# Patient Record
Sex: FEMALE | Race: WHITE | URBAN - METROPOLITAN AREA
[De-identification: names, ages, dates, MRNs, and addresses within clinical notes are randomized per-mention and may not be internally consistent; named-entity substitution may affect disease eponyms.]

---

## 2019-02-12 ENCOUNTER — APPOINTMENT (OUTPATIENT)
Dept: GENERAL RADIOLOGY | Age: 24
End: 2019-02-12

## 2019-02-12 ENCOUNTER — WALK IN (OUTPATIENT)
Dept: URGENT CARE | Age: 24
End: 2019-02-12

## 2019-02-12 DIAGNOSIS — M79.641 RIGHT HAND PAIN: Primary | ICD-10-CM

## 2019-02-12 PROCEDURE — 73130 X-RAY EXAM OF HAND: CPT | Performed by: PHYSICIAN ASSISTANT

## 2019-02-12 PROCEDURE — 99203 OFFICE O/P NEW LOW 30 MIN: CPT | Performed by: PHYSICIAN ASSISTANT

## 2019-02-12 ASSESSMENT — ENCOUNTER SYMPTOMS: CONSTITUTIONAL NEGATIVE: 1

## 2019-02-12 ASSESSMENT — PAIN SCALES - GENERAL: PAINLEVEL: 3-4

## 2019-02-13 ENCOUNTER — TELEPHONE (OUTPATIENT)
Dept: URGENT CARE | Age: 24
End: 2019-02-13

## 2020-11-01 ENCOUNTER — RX ONLY (RX ONLY)
Age: 25
End: 2020-11-01

## 2021-01-04 ENCOUNTER — HEALTH MAINTENANCE LETTER (OUTPATIENT)
Age: 26
End: 2021-01-04

## 2021-05-18 ENCOUNTER — APPOINTMENT (OUTPATIENT)
Dept: URBAN - METROPOLITAN AREA CLINIC 253 | Age: 26
Setting detail: DERMATOLOGY
End: 2021-05-18

## 2021-05-18 VITALS — HEIGHT: 63 IN | WEIGHT: 125 LBS | RESPIRATION RATE: 14 BRPM

## 2021-05-18 DIAGNOSIS — L70.0 ACNE VULGARIS: ICD-10-CM

## 2021-05-18 PROCEDURE — OTHER COUNSELING: OTHER

## 2021-05-18 PROCEDURE — OTHER ADDITIONAL NOTES: OTHER

## 2021-05-18 PROCEDURE — 99204 OFFICE O/P NEW MOD 45 MIN: CPT

## 2021-05-18 PROCEDURE — 81025 URINE PREGNANCY TEST: CPT

## 2021-05-18 PROCEDURE — OTHER URINE PREGNANCY TEST: OTHER

## 2021-05-18 PROCEDURE — OTHER ISOTRETINOIN INITIATION: OTHER

## 2021-05-18 ASSESSMENT — LOCATION DETAILED DESCRIPTION DERM: LOCATION DETAILED: LEFT MEDIAL FOREHEAD

## 2021-05-18 ASSESSMENT — LOCATION SIMPLE DESCRIPTION DERM: LOCATION SIMPLE: LEFT FOREHEAD

## 2021-05-18 ASSESSMENT — LOCATION ZONE DERM: LOCATION ZONE: FACE

## 2021-05-18 NOTE — PROCEDURE: COUNSELING
Minocycline Counseling: Patient advised regarding possible photosensitivity and discoloration of the teeth, skin, lips, tongue and gums.  Patient instructed to avoid sunlight, if possible.  When exposed to sunlight, patients should wear protective clothing, sunglasses, and sunscreen.  The patient was instructed to call the office immediately if the following severe adverse effects occur:  hearing changes, easy bruising/bleeding, severe headache, or vision changes.  The patient verbalized understanding of the proper use and possible adverse effects of minocycline.  All of the patient's questions and concerns were addressed.
Sarecycline Pregnancy And Lactation Text: This medication is Pregnancy Category D and not consider safe during pregnancy. It is also excreted in breast milk.
Spironolactone Pregnancy And Lactation Text: This medication can cause feminization of the male fetus and should be avoided during pregnancy. The active metabolite is also found in breast milk.
Isotretinoin Pregnancy And Lactation Text: This medication is Pregnancy Category X and is considered extremely dangerous during pregnancy. It is unknown if it is excreted in breast milk.
Topical Retinoid Pregnancy And Lactation Text: This medication is Pregnancy Category C. It is unknown if this medication is excreted in breast milk.
Sarecycline Counseling: Patient advised regarding possible photosensitivity and discoloration of the teeth, skin, lips, tongue and gums.  Patient instructed to avoid sunlight, if possible.  When exposed to sunlight, patients should wear protective clothing, sunglasses, and sunscreen.  The patient was instructed to call the office immediately if the following severe adverse effects occur:  hearing changes, easy bruising/bleeding, severe headache, or vision changes.  The patient verbalized understanding of the proper use and possible adverse effects of sarecycline.  All of the patient's questions and concerns were addressed.
Bactrim Pregnancy And Lactation Text: This medication is Pregnancy Category D and is known to cause fetal risk.  It is also excreted in breast milk.
Bactrim Counseling:  I discussed with the patient the risks of sulfa antibiotics including but not limited to GI upset, allergic reaction, drug rash, diarrhea, dizziness, photosensitivity, and yeast infections.  Rarely, more serious reactions can occur including but not limited to aplastic anemia, agranulocytosis, methemoglobinemia, blood dyscrasias, liver or kidney failure, lung infiltrates or desquamative/blistering drug rashes.
High Dose Vitamin A Counseling: Side effects reviewed, pt to contact office should one occur.
Topical Retinoid counseling:  Patient advised to apply a pea-sized amount only at bedtime and wait 30 minutes after washing their face before applying.  If too drying, patient may add a non-comedogenic moisturizer. The patient verbalized understanding of the proper use and possible adverse effects of retinoids.  All of the patient's questions and concerns were addressed.
Include Pregnancy/Lactation Warning?: No
Spironolactone Counseling: Patient advised regarding risks of diarrhea, abdominal pain, hyperkalemia, birth defects (for female patients), liver toxicity and renal toxicity. The patient may need blood work to monitor liver and kidney function and potassium levels while on therapy. The patient verbalized understanding of the proper use and possible adverse effects of spironolactone.  All of the patient's questions and concerns were addressed.
Topical Sulfur Applications Pregnancy And Lactation Text: This medication is Pregnancy Category C and has an unknown safety profile during pregnancy. It is unknown if this topical medication is excreted in breast milk.
Birth Control Pills Counseling: Birth Control Pill Counseling: I discussed with the patient the potential side effects of OCPs including but not limited to increased risk of stroke, heart attack, thrombophlebitis, deep venous thrombosis, hepatic adenomas, breast changes, GI upset, headaches, and depression.  The patient verbalized understanding of the proper use and possible adverse effects of OCPs. All of the patient's questions and concerns were addressed.
Dapsone Pregnancy And Lactation Text: This medication is Pregnancy Category C and is not considered safe during pregnancy or breast feeding.
Azithromycin Counseling:  I discussed with the patient the risks of azithromycin including but not limited to GI upset, allergic reaction, drug rash, diarrhea, and yeast infections.
Erythromycin Counseling:  I discussed with the patient the risks of erythromycin including but not limited to GI upset, allergic reaction, drug rash, diarrhea, increase in liver enzymes, and yeast infections.
Topical Clindamycin Counseling: Patient counseled that this medication may cause skin irritation or allergic reactions.  In the event of skin irritation, the patient was advised to reduce the amount of the drug applied or use it less frequently.   The patient verbalized understanding of the proper use and possible adverse effects of clindamycin.  All of the patient's questions and concerns were addressed.
Birth Control Pills Pregnancy And Lactation Text: This medication should be avoided if pregnant and for the first 30 days post-partum.
Detail Level: Zone
High Dose Vitamin A Pregnancy And Lactation Text: High dose vitamin A therapy is contraindicated during pregnancy and breast feeding.
Dapsone Counseling: I discussed with the patient the risks of dapsone including but not limited to hemolytic anemia, agranulocytosis, rashes, methemoglobinemia, kidney failure, peripheral neuropathy, headaches, GI upset, and liver toxicity.  Patients who start dapsone require monitoring including baseline LFTs and weekly CBCs for the first month, then every month thereafter.  The patient verbalized understanding of the proper use and possible adverse effects of dapsone.  All of the patient's questions and concerns were addressed.
Tazorac Pregnancy And Lactation Text: This medication is not safe during pregnancy. It is unknown if this medication is excreted in breast milk.
Tetracycline Counseling: Patient counseled regarding possible photosensitivity and increased risk for sunburn.  Patient instructed to avoid sunlight, if possible.  When exposed to sunlight, patients should wear protective clothing, sunglasses, and sunscreen.  The patient was instructed to call the office immediately if the following severe adverse effects occur:  hearing changes, easy bruising/bleeding, severe headache, or vision changes.  The patient verbalized understanding of the proper use and possible adverse effects of tetracycline.  All of the patient's questions and concerns were addressed. Patient understands to avoid pregnancy while on therapy due to potential birth defects.
Topical Sulfur Applications Counseling: Topical Sulfur Counseling: Patient counseled that this medication may cause skin irritation or allergic reactions.  In the event of skin irritation, the patient was advised to reduce the amount of the drug applied or use it less frequently.   The patient verbalized understanding of the proper use and possible adverse effects of topical sulfur application.  All of the patient's questions and concerns were addressed.
Isotretinoin Counseling: Patient should get monthly blood tests, not donate blood, not drive at night if vision affected, not share medication, and not undergo elective surgery for 6 months after tx completed. Side effects reviewed, pt to contact office should one occur.
Azithromycin Pregnancy And Lactation Text: This medication is considered safe during pregnancy and is also secreted in breast milk.
Doxycycline Counseling:  Patient counseled regarding possible photosensitivity and increased risk for sunburn.  Patient instructed to avoid sunlight, if possible.  When exposed to sunlight, patients should wear protective clothing, sunglasses, and sunscreen.  The patient was instructed to call the office immediately if the following severe adverse effects occur:  hearing changes, easy bruising/bleeding, severe headache, or vision changes.  The patient verbalized understanding of the proper use and possible adverse effects of doxycycline.  All of the patient's questions and concerns were addressed.
Topical Clindamycin Pregnancy And Lactation Text: This medication is Pregnancy Category B and is considered safe during pregnancy. It is unknown if it is excreted in breast milk.
Benzoyl Peroxide Pregnancy And Lactation Text: This medication is Pregnancy Category C. It is unknown if benzoyl peroxide is excreted in breast milk.
Doxycycline Pregnancy And Lactation Text: This medication is Pregnancy Category D and not consider safe during pregnancy. It is also excreted in breast milk but is considered safe for shorter treatment courses.
Benzoyl Peroxide Counseling: Patient counseled that medicine may cause skin irritation and bleach clothing.  In the event of skin irritation, the patient was advised to reduce the amount of the drug applied or use it less frequently.   The patient verbalized understanding of the proper use and possible adverse effects of benzoyl peroxide.  All of the patient's questions and concerns were addressed.
Tazorac Counseling:  Patient advised that medication is irritating and drying.  Patient may need to apply sparingly and wash off after an hour before eventually leaving it on overnight.  The patient verbalized understanding of the proper use and possible adverse effects of tazorac.  All of the patient's questions and concerns were addressed.
Erythromycin Pregnancy And Lactation Text: This medication is Pregnancy Category B and is considered safe during pregnancy. It is also excreted in breast milk.

## 2021-05-18 NOTE — PROCEDURE: ADDITIONAL NOTES
Render Risk Assessment In Note?: no
Detail Level: Simple
Additional Notes: Fasting labs at next OV\\n\\nIsotretinoin Counseling: Reviewed with patient that treatment includes monthly visits with pregnancy tests and frequent blood draws. Explained that the medication is metabolized through the liver so we monitor liver function and cholesterol levels. Stated that typical treatment time is 5-9 months. Patient should be on two forms of birth control or pledge abstinence, not donate blood or plasma, not share medication, and not undergo elective surgery or procedures including waxing, tattoos, or piercings while on treatment or for 6 months after treatment is completed. Side effects were reviewed with the patient in detail including but not limited to dry skin, lips, eyes and nose, nosebleeds, joint or muscle aches, flushing, and headaches. Rare but important side effects to notify the clinic about include mood changes, depression, or bowel changes. Recommended that the patient review the iPledge booklet in detail. Counseled that in many patients, acne may return at an unpredictable time for unknown reasons. All questions were answered regarding this medication. \\n\\nA clinical assistant was present for the exam. Care instructions were explained to the patient in detail. Told patient to call with any concerns or questions. The patient verbalized understanding and agreement of the education provided and the treatment plan. Encouraged patient to schedule a follow up appointment right after visit. At the end of the visit, all questions had been answered and the patient was satisfied with the visit.

## 2021-05-18 NOTE — HPI: PIMPLES (ACNE)
What Type Of Note Output Would You Prefer (Optional)?: Standard Output
How Severe Is Your Acne?: moderate
Is This A New Presentation, Or A Follow-Up?: Acne
Additional Comments (Use Complete Sentences): On the pill, also had tried tazorac in the past which helped.

## 2021-05-26 VITALS
HEART RATE: 76 BPM | TEMPERATURE: 98 F | OXYGEN SATURATION: 100 % | HEIGHT: 63 IN | RESPIRATION RATE: 16 BRPM | WEIGHT: 125 LBS | BODY MASS INDEX: 22.15 KG/M2

## 2021-06-22 ENCOUNTER — APPOINTMENT (OUTPATIENT)
Dept: URBAN - METROPOLITAN AREA CLINIC 253 | Age: 26
Setting detail: DERMATOLOGY
End: 2021-06-22

## 2021-06-22 VITALS — RESPIRATION RATE: 15 BRPM | HEIGHT: 63 IN | WEIGHT: 125 LBS

## 2021-06-22 DIAGNOSIS — L70.0 ACNE VULGARIS: ICD-10-CM

## 2021-06-22 PROCEDURE — OTHER ADDITIONAL NOTES: OTHER

## 2021-06-22 PROCEDURE — OTHER ISOTRETINOIN INITIATION: OTHER

## 2021-06-22 PROCEDURE — OTHER PRESCRIPTION: OTHER

## 2021-06-22 PROCEDURE — 81025 URINE PREGNANCY TEST: CPT

## 2021-06-22 PROCEDURE — 99214 OFFICE O/P EST MOD 30 MIN: CPT

## 2021-06-22 PROCEDURE — OTHER ORDER TESTS: OTHER

## 2021-06-22 PROCEDURE — OTHER COUNSELING: OTHER

## 2021-06-22 PROCEDURE — OTHER URINE PREGNANCY TEST: OTHER

## 2021-06-22 RX ORDER — ISOTRETINOIN 20 MG/1
CAPSULE, LIQUID FILLED ORAL QD
Qty: 30 | Refills: 0 | Status: ERX | COMMUNITY
Start: 2021-06-22

## 2021-06-22 ASSESSMENT — LOCATION SIMPLE DESCRIPTION DERM: LOCATION SIMPLE: LEFT FOREHEAD

## 2021-06-22 ASSESSMENT — LOCATION DETAILED DESCRIPTION DERM: LOCATION DETAILED: LEFT MEDIAL FOREHEAD

## 2021-06-22 ASSESSMENT — LOCATION ZONE DERM: LOCATION ZONE: FACE

## 2021-06-22 NOTE — HPI: PIMPLES (ACNE)
What Type Of Note Output Would You Prefer (Optional)?: Standard Output
How Severe Is Your Acne?: severe
Is This A New Presentation, Or A Follow-Up?: Follow Up Acne
Additional Comments (Use Complete Sentences): Here ready to start accutane.

## 2021-06-22 NOTE — PROCEDURE: COUNSELING
Minocycline Counseling: Patient advised regarding possible photosensitivity and discoloration of the teeth, skin, lips, tongue and gums.  Patient instructed to avoid sunlight, if possible.  When exposed to sunlight, patients should wear protective clothing, sunglasses, and sunscreen.  The patient was instructed to call the office immediately if the following severe adverse effects occur:  hearing changes, easy bruising/bleeding, severe headache, or vision changes.  The patient verbalized understanding of the proper use and possible adverse effects of minocycline.  All of the patient's questions and concerns were addressed.
Sarecycline Pregnancy And Lactation Text: This medication is Pregnancy Category D and not consider safe during pregnancy. It is also excreted in breast milk.
Erythromycin Pregnancy And Lactation Text: This medication is Pregnancy Category B and is considered safe during pregnancy. It is also excreted in breast milk.
Azithromycin Counseling:  I discussed with the patient the risks of azithromycin including but not limited to GI upset, allergic reaction, drug rash, diarrhea, and yeast infections.
Isotretinoin Pregnancy And Lactation Text: This medication is Pregnancy Category X and is considered extremely dangerous during pregnancy. It is unknown if it is excreted in breast milk.
Birth Control Pills Pregnancy And Lactation Text: This medication should be avoided if pregnant and for the first 30 days post-partum.
Bactrim Pregnancy And Lactation Text: This medication is Pregnancy Category D and is known to cause fetal risk.  It is also excreted in breast milk.
Spironolactone Pregnancy And Lactation Text: This medication can cause feminization of the male fetus and should be avoided during pregnancy. The active metabolite is also found in breast milk.
Topical Sulfur Applications Counseling: Topical Sulfur Counseling: Patient counseled that this medication may cause skin irritation or allergic reactions.  In the event of skin irritation, the patient was advised to reduce the amount of the drug applied or use it less frequently.   The patient verbalized understanding of the proper use and possible adverse effects of topical sulfur application.  All of the patient's questions and concerns were addressed.
Bactrim Counseling:  I discussed with the patient the risks of sulfa antibiotics including but not limited to GI upset, allergic reaction, drug rash, diarrhea, dizziness, photosensitivity, and yeast infections.  Rarely, more serious reactions can occur including but not limited to aplastic anemia, agranulocytosis, methemoglobinemia, blood dyscrasias, liver or kidney failure, lung infiltrates or desquamative/blistering drug rashes.
Include Pregnancy/Lactation Warning?: No
Birth Control Pills Counseling: Birth Control Pill Counseling: I discussed with the patient the potential side effects of OCPs including but not limited to increased risk of stroke, heart attack, thrombophlebitis, deep venous thrombosis, hepatic adenomas, breast changes, GI upset, headaches, and depression.  The patient verbalized understanding of the proper use and possible adverse effects of OCPs. All of the patient's questions and concerns were addressed.
Spironolactone Counseling: Patient advised regarding risks of diarrhea, abdominal pain, hyperkalemia, birth defects (for female patients), liver toxicity and renal toxicity. The patient may need blood work to monitor liver and kidney function and potassium levels while on therapy. The patient verbalized understanding of the proper use and possible adverse effects of spironolactone.  All of the patient's questions and concerns were addressed.
Dapsone Pregnancy And Lactation Text: This medication is Pregnancy Category C and is not considered safe during pregnancy or breast feeding.
Doxycycline Pregnancy And Lactation Text: This medication is Pregnancy Category D and not consider safe during pregnancy. It is also excreted in breast milk but is considered safe for shorter treatment courses.
Erythromycin Counseling:  I discussed with the patient the risks of erythromycin including but not limited to GI upset, allergic reaction, drug rash, diarrhea, increase in liver enzymes, and yeast infections.
Topical Sulfur Applications Pregnancy And Lactation Text: This medication is Pregnancy Category C and has an unknown safety profile during pregnancy. It is unknown if this topical medication is excreted in breast milk.
Tazorac Counseling:  Patient advised that medication is irritating and drying.  Patient may need to apply sparingly and wash off after an hour before eventually leaving it on overnight.  The patient verbalized understanding of the proper use and possible adverse effects of tazorac.  All of the patient's questions and concerns were addressed.
Topical Clindamycin Counseling: Patient counseled that this medication may cause skin irritation or allergic reactions.  In the event of skin irritation, the patient was advised to reduce the amount of the drug applied or use it less frequently.   The patient verbalized understanding of the proper use and possible adverse effects of clindamycin.  All of the patient's questions and concerns were addressed.
Tetracycline Counseling: Patient counseled regarding possible photosensitivity and increased risk for sunburn.  Patient instructed to avoid sunlight, if possible.  When exposed to sunlight, patients should wear protective clothing, sunglasses, and sunscreen.  The patient was instructed to call the office immediately if the following severe adverse effects occur:  hearing changes, easy bruising/bleeding, severe headache, or vision changes.  The patient verbalized understanding of the proper use and possible adverse effects of tetracycline.  All of the patient's questions and concerns were addressed. Patient understands to avoid pregnancy while on therapy due to potential birth defects.
High Dose Vitamin A Pregnancy And Lactation Text: High dose vitamin A therapy is contraindicated during pregnancy and breast feeding.
Dapsone Counseling: I discussed with the patient the risks of dapsone including but not limited to hemolytic anemia, agranulocytosis, rashes, methemoglobinemia, kidney failure, peripheral neuropathy, headaches, GI upset, and liver toxicity.  Patients who start dapsone require monitoring including baseline LFTs and weekly CBCs for the first month, then every month thereafter.  The patient verbalized understanding of the proper use and possible adverse effects of dapsone.  All of the patient's questions and concerns were addressed.
High Dose Vitamin A Counseling: Side effects reviewed, pt to contact office should one occur.
Tazorac Pregnancy And Lactation Text: This medication is not safe during pregnancy. It is unknown if this medication is excreted in breast milk.
Doxycycline Counseling:  Patient counseled regarding possible photosensitivity and increased risk for sunburn.  Patient instructed to avoid sunlight, if possible.  When exposed to sunlight, patients should wear protective clothing, sunglasses, and sunscreen.  The patient was instructed to call the office immediately if the following severe adverse effects occur:  hearing changes, easy bruising/bleeding, severe headache, or vision changes.  The patient verbalized understanding of the proper use and possible adverse effects of doxycycline.  All of the patient's questions and concerns were addressed.
Topical Clindamycin Pregnancy And Lactation Text: This medication is Pregnancy Category B and is considered safe during pregnancy. It is unknown if it is excreted in breast milk.
Sarecycline Counseling: Patient advised regarding possible photosensitivity and discoloration of the teeth, skin, lips, tongue and gums.  Patient instructed to avoid sunlight, if possible.  When exposed to sunlight, patients should wear protective clothing, sunglasses, and sunscreen.  The patient was instructed to call the office immediately if the following severe adverse effects occur:  hearing changes, easy bruising/bleeding, severe headache, or vision changes.  The patient verbalized understanding of the proper use and possible adverse effects of sarecycline.  All of the patient's questions and concerns were addressed.
Detail Level: Zone
Isotretinoin Counseling: Patient should get monthly blood tests, not donate blood, not drive at night if vision affected, not share medication, and not undergo elective surgery for 6 months after tx completed. Side effects reviewed, pt to contact office should one occur.
Azithromycin Pregnancy And Lactation Text: This medication is considered safe during pregnancy and is also secreted in breast milk.
Benzoyl Peroxide Counseling: Patient counseled that medicine may cause skin irritation and bleach clothing.  In the event of skin irritation, the patient was advised to reduce the amount of the drug applied or use it less frequently.   The patient verbalized understanding of the proper use and possible adverse effects of benzoyl peroxide.  All of the patient's questions and concerns were addressed.
Topical Retinoid counseling:  Patient advised to apply a pea-sized amount only at bedtime and wait 30 minutes after washing their face before applying.  If too drying, patient may add a non-comedogenic moisturizer. The patient verbalized understanding of the proper use and possible adverse effects of retinoids.  All of the patient's questions and concerns were addressed.
Benzoyl Peroxide Pregnancy And Lactation Text: This medication is Pregnancy Category C. It is unknown if benzoyl peroxide is excreted in breast milk.
Topical Retinoid Pregnancy And Lactation Text: This medication is Pregnancy Category C. It is unknown if this medication is excreted in breast milk.

## 2021-06-22 NOTE — PROCEDURE: ADDITIONAL NOTES
Render Risk Assessment In Note?: no
Additional Notes: Isotretinoin Counseling: Reviewed with patient that treatment includes monthly visits with pregnancy tests and frequent blood draws. Explained that the medication is metabolized through the liver so we monitor liver function and cholesterol levels. Stated that typical treatment time is 5-9 months. Patient should be on two forms of birth control or pledge abstinence, not donate blood or plasma, not share medication, and not undergo elective surgery or procedures including waxing, tattoos, or piercings while on treatment or for 6 months after treatment is completed. Side effects were reviewed with the patient in detail including but not limited to dry skin, lips, eyes and nose, nosebleeds, joint or muscle aches, flushing, and headaches. Rare but important side effects to notify the clinic about include mood changes, depression, or bowel changes. Recommended that the patient review the iPledge booklet in detail. Counseled that in many patients, acne may return at an unpredictable time for unknown reasons. All questions were answered regarding this medication.\\n\\n\\nA clinical assistant was present for the exam. Care instructions were explained to the patient in detail. Told patient to call with any concerns or questions. The patient verbalized understanding and agreement of the education provided and the treatment plan. Encouraged patient to schedule a follow up appointment right after visit. At the end of the visit, all questions had been answered and the patient was satisfied with the visit.
Detail Level: Simple

## 2021-07-20 ENCOUNTER — APPOINTMENT (OUTPATIENT)
Dept: URBAN - METROPOLITAN AREA CLINIC 253 | Age: 26
Setting detail: DERMATOLOGY
End: 2021-07-20

## 2021-07-20 VITALS — RESPIRATION RATE: 14 BRPM | HEIGHT: 62.5 IN | WEIGHT: 125 LBS

## 2021-07-20 DIAGNOSIS — L70.0 ACNE VULGARIS: ICD-10-CM

## 2021-07-20 PROCEDURE — OTHER ADDITIONAL NOTES: OTHER

## 2021-07-20 PROCEDURE — OTHER VENIPUNCTURE: OTHER

## 2021-07-20 PROCEDURE — OTHER COUNSELING: OTHER

## 2021-07-20 PROCEDURE — 36415 COLL VENOUS BLD VENIPUNCTURE: CPT

## 2021-07-20 PROCEDURE — 99214 OFFICE O/P EST MOD 30 MIN: CPT | Mod: 25

## 2021-07-20 PROCEDURE — OTHER URINE PREGNANCY TEST: OTHER

## 2021-07-20 PROCEDURE — 81025 URINE PREGNANCY TEST: CPT

## 2021-07-20 PROCEDURE — OTHER PRESCRIPTION: OTHER

## 2021-07-20 PROCEDURE — OTHER ISOTRETINOIN MONITORING: OTHER

## 2021-07-20 PROCEDURE — OTHER ORDER TESTS: OTHER

## 2021-07-20 RX ORDER — ISOTRETINOIN 30 MG/1
30 MG CAPSULE, LIQUID FILLED ORAL QD
Qty: 60 | Refills: 0 | Status: ERX | COMMUNITY
Start: 2021-07-20

## 2021-07-20 ASSESSMENT — LOCATION ZONE DERM: LOCATION ZONE: FACE

## 2021-07-20 ASSESSMENT — LOCATION SIMPLE DESCRIPTION DERM: LOCATION SIMPLE: RIGHT FOREHEAD

## 2021-07-20 ASSESSMENT — LOCATION DETAILED DESCRIPTION DERM: LOCATION DETAILED: RIGHT MEDIAL FOREHEAD

## 2021-07-20 NOTE — PROCEDURE: VENIPUNCTURE
Detail Level: None
Venipuncture Paragraph: An alcohol pad was applied to the venipuncture site. Venipuncture was performed using a butterfly needle. Pressure and a bandaid was applied to the site. No complications were noted.
Number Of Tubes Drawn: 1
Bill [id number] For Specimen Handling/Conveyance To Laboratory?: no

## 2021-07-20 NOTE — PROCEDURE: ADDITIONAL NOTES
Render Risk Assessment In Note?: no
Detail Level: Simple
Additional Notes: LB discussed prednisone taper to help knock down current flare. Patient declined\\nRecommended humidifier if needed to help with dry skin\\n\\nIsotretinoin Counseling: Reviewed with patient that treatment includes monthly visits with pregnancy tests and frequent blood draws. Explained that the medication is metabolized through the liver so we monitor liver function and cholesterol levels. Stated that typical treatment time is 5-9 months. Patient should be on two forms of birth control or pledge abstinence, not donate blood or plasma, not share medication, and not undergo elective surgery or procedures including waxing, tattoos, or piercings while on treatment or for 6 months after treatment is completed. Side effects were reviewed with the patient in detail including but not limited to dry skin, lips, eyes and nose, nosebleeds, joint or muscle aches, flushing, and headaches. Rare but important side effects to notify the clinic about include mood changes, depression, or bowel changes. Recommended that the patient review the iPledge booklet in detail. Counseled that in many patients, acne may return at an unpredictable time for unknown reasons. All questions were answered regarding this medication. \\n\\nA clinical assistant was present for the exam. Care instructions were explained to the patient in detail. Told patient to call with any concerns or questions. The patient verbalized understanding and agreement of the education provided and the treatment plan. Encouraged patient to schedule a follow up appointment right after visit. At the end of the visit, all questions had been answered and the patient was satisfied with the visit.

## 2021-07-20 NOTE — HPI: PIMPLES (ACNE)
What Type Of Note Output Would You Prefer (Optional)?: Standard Output
Is This A New Presentation, Or A Follow-Up?: Follow Up Acne
Additional Comments (Use Complete Sentences): Month went well. No side effects besides dry lips and eyes, eyes were only for a couple days then they got better. Her acne maybe flared a little, more pimples coming up but going away a lot faster. Nothing super drastic. She has had some very minor nausea in the mornings but when she eats breakfast it goes away.

## 2021-07-20 NOTE — PROCEDURE: COUNSELING
Birth Control Pills Pregnancy And Lactation Text: This medication should be avoided if pregnant and for the first 30 days post-partum.
Doxycycline Counseling:  Patient counseled regarding possible photosensitivity and increased risk for sunburn.  Patient instructed to avoid sunlight, if possible.  When exposed to sunlight, patients should wear protective clothing, sunglasses, and sunscreen.  The patient was instructed to call the office immediately if the following severe adverse effects occur:  hearing changes, easy bruising/bleeding, severe headache, or vision changes.  The patient verbalized understanding of the proper use and possible adverse effects of doxycycline.  All of the patient's questions and concerns were addressed.
Sarecycline Counseling: Patient advised regarding possible photosensitivity and discoloration of the teeth, skin, lips, tongue and gums.  Patient instructed to avoid sunlight, if possible.  When exposed to sunlight, patients should wear protective clothing, sunglasses, and sunscreen.  The patient was instructed to call the office immediately if the following severe adverse effects occur:  hearing changes, easy bruising/bleeding, severe headache, or vision changes.  The patient verbalized understanding of the proper use and possible adverse effects of sarecycline.  All of the patient's questions and concerns were addressed.
Dapsone Pregnancy And Lactation Text: This medication is Pregnancy Category C and is not considered safe during pregnancy or breast feeding.
Spironolactone Counseling: Patient advised regarding risks of diarrhea, abdominal pain, hyperkalemia, birth defects (for female patients), liver toxicity and renal toxicity. The patient may need blood work to monitor liver and kidney function and potassium levels while on therapy. The patient verbalized understanding of the proper use and possible adverse effects of spironolactone.  All of the patient's questions and concerns were addressed.
Erythromycin Counseling:  I discussed with the patient the risks of erythromycin including but not limited to GI upset, allergic reaction, drug rash, diarrhea, increase in liver enzymes, and yeast infections.
Topical Clindamycin Pregnancy And Lactation Text: This medication is Pregnancy Category B and is considered safe during pregnancy. It is unknown if it is excreted in breast milk.
Topical Sulfur Applications Counseling: Topical Sulfur Counseling: Patient counseled that this medication may cause skin irritation or allergic reactions.  In the event of skin irritation, the patient was advised to reduce the amount of the drug applied or use it less frequently.   The patient verbalized understanding of the proper use and possible adverse effects of topical sulfur application.  All of the patient's questions and concerns were addressed.
Birth Control Pills Counseling: Birth Control Pill Counseling: I discussed with the patient the potential side effects of OCPs including but not limited to increased risk of stroke, heart attack, thrombophlebitis, deep venous thrombosis, hepatic adenomas, breast changes, GI upset, headaches, and depression.  The patient verbalized understanding of the proper use and possible adverse effects of OCPs. All of the patient's questions and concerns were addressed.
Tazorac Pregnancy And Lactation Text: This medication is not safe during pregnancy. It is unknown if this medication is excreted in breast milk.
Include Pregnancy/Lactation Warning?: No
Benzoyl Peroxide Pregnancy And Lactation Text: This medication is Pregnancy Category C. It is unknown if benzoyl peroxide is excreted in breast milk.
Tetracycline Pregnancy And Lactation Text: This medication is Pregnancy Category D and not consider safe during pregnancy. It is also excreted in breast milk.
Doxycycline Pregnancy And Lactation Text: This medication is Pregnancy Category D and not consider safe during pregnancy. It is also excreted in breast milk but is considered safe for shorter treatment courses.
Detail Level: Zone
Erythromycin Pregnancy And Lactation Text: This medication is Pregnancy Category B and is considered safe during pregnancy. It is also excreted in breast milk.
Tazorac Counseling:  Patient advised that medication is irritating and drying.  Patient may need to apply sparingly and wash off after an hour before eventually leaving it on overnight.  The patient verbalized understanding of the proper use and possible adverse effects of tazorac.  All of the patient's questions and concerns were addressed.
High Dose Vitamin A Counseling: Side effects reviewed, pt to contact office should one occur.
High Dose Vitamin A Pregnancy And Lactation Text: High dose vitamin A therapy is contraindicated during pregnancy and breast feeding.
Tetracycline Counseling: Patient counseled regarding possible photosensitivity and increased risk for sunburn.  Patient instructed to avoid sunlight, if possible.  When exposed to sunlight, patients should wear protective clothing, sunglasses, and sunscreen.  The patient was instructed to call the office immediately if the following severe adverse effects occur:  hearing changes, easy bruising/bleeding, severe headache, or vision changes.  The patient verbalized understanding of the proper use and possible adverse effects of tetracycline.  All of the patient's questions and concerns were addressed. Patient understands to avoid pregnancy while on therapy due to potential birth defects.
Azithromycin Pregnancy And Lactation Text: This medication is considered safe during pregnancy and is also secreted in breast milk.
Isotretinoin Pregnancy And Lactation Text: This medication is Pregnancy Category X and is considered extremely dangerous during pregnancy. It is unknown if it is excreted in breast milk.
Bactrim Counseling:  I discussed with the patient the risks of sulfa antibiotics including but not limited to GI upset, allergic reaction, drug rash, diarrhea, dizziness, photosensitivity, and yeast infections.  Rarely, more serious reactions can occur including but not limited to aplastic anemia, agranulocytosis, methemoglobinemia, blood dyscrasias, liver or kidney failure, lung infiltrates or desquamative/blistering drug rashes.
Bactrim Pregnancy And Lactation Text: This medication is Pregnancy Category D and is known to cause fetal risk.  It is also excreted in breast milk.
Benzoyl Peroxide Counseling: Patient counseled that medicine may cause skin irritation and bleach clothing.  In the event of skin irritation, the patient was advised to reduce the amount of the drug applied or use it less frequently.   The patient verbalized understanding of the proper use and possible adverse effects of benzoyl peroxide.  All of the patient's questions and concerns were addressed.
Topical Retinoid counseling:  Patient advised to apply a pea-sized amount only at bedtime and wait 30 minutes after washing their face before applying.  If too drying, patient may add a non-comedogenic moisturizer. The patient verbalized understanding of the proper use and possible adverse effects of retinoids.  All of the patient's questions and concerns were addressed.
Dapsone Counseling: I discussed with the patient the risks of dapsone including but not limited to hemolytic anemia, agranulocytosis, rashes, methemoglobinemia, kidney failure, peripheral neuropathy, headaches, GI upset, and liver toxicity.  Patients who start dapsone require monitoring including baseline LFTs and weekly CBCs for the first month, then every month thereafter.  The patient verbalized understanding of the proper use and possible adverse effects of dapsone.  All of the patient's questions and concerns were addressed.
Spironolactone Pregnancy And Lactation Text: This medication can cause feminization of the male fetus and should be avoided during pregnancy. The active metabolite is also found in breast milk.
Topical Sulfur Applications Pregnancy And Lactation Text: This medication is Pregnancy Category C and has an unknown safety profile during pregnancy. It is unknown if this topical medication is excreted in breast milk.
Topical Retinoid Pregnancy And Lactation Text: This medication is Pregnancy Category C. It is unknown if this medication is excreted in breast milk.
Isotretinoin Counseling: Patient should get monthly blood tests, not donate blood, not drive at night if vision affected, not share medication, and not undergo elective surgery for 6 months after tx completed. Side effects reviewed, pt to contact office should one occur.
Azithromycin Counseling:  I discussed with the patient the risks of azithromycin including but not limited to GI upset, allergic reaction, drug rash, diarrhea, and yeast infections.
Topical Clindamycin Counseling: Patient counseled that this medication may cause skin irritation or allergic reactions.  In the event of skin irritation, the patient was advised to reduce the amount of the drug applied or use it less frequently.   The patient verbalized understanding of the proper use and possible adverse effects of clindamycin.  All of the patient's questions and concerns were addressed.
Minocycline Counseling: Patient advised regarding possible photosensitivity and discoloration of the teeth, skin, lips, tongue and gums.  Patient instructed to avoid sunlight, if possible.  When exposed to sunlight, patients should wear protective clothing, sunglasses, and sunscreen.  The patient was instructed to call the office immediately if the following severe adverse effects occur:  hearing changes, easy bruising/bleeding, severe headache, or vision changes.  The patient verbalized understanding of the proper use and possible adverse effects of minocycline.  All of the patient's questions and concerns were addressed.

## 2021-08-20 ENCOUNTER — APPOINTMENT (OUTPATIENT)
Dept: URBAN - METROPOLITAN AREA CLINIC 253 | Age: 26
Setting detail: DERMATOLOGY
End: 2021-08-20

## 2021-08-20 VITALS — HEIGHT: 62.5 IN | WEIGHT: 125 LBS

## 2021-08-20 DIAGNOSIS — L70.0 ACNE VULGARIS: ICD-10-CM

## 2021-08-20 PROCEDURE — 81025 URINE PREGNANCY TEST: CPT

## 2021-08-20 PROCEDURE — OTHER ISOTRETINOIN MONITORING: OTHER

## 2021-08-20 PROCEDURE — 99214 OFFICE O/P EST MOD 30 MIN: CPT | Mod: 25

## 2021-08-20 PROCEDURE — OTHER VENIPUNCTURE: OTHER

## 2021-08-20 PROCEDURE — OTHER COUNSELING: OTHER

## 2021-08-20 PROCEDURE — 36415 COLL VENOUS BLD VENIPUNCTURE: CPT

## 2021-08-20 PROCEDURE — OTHER PRESCRIPTION: OTHER

## 2021-08-20 PROCEDURE — OTHER URINE PREGNANCY TEST: OTHER

## 2021-08-20 PROCEDURE — OTHER ORDER TESTS: OTHER

## 2021-08-20 PROCEDURE — OTHER ADDITIONAL NOTES: OTHER

## 2021-08-20 RX ORDER — ISOTRETINOIN 30 MG/1
30 MG CAPSULE, LIQUID FILLED ORAL QD
Qty: 60 | Refills: 0 | Status: ERX

## 2021-08-20 ASSESSMENT — LOCATION ZONE DERM: LOCATION ZONE: FACE

## 2021-08-20 ASSESSMENT — LOCATION SIMPLE DESCRIPTION DERM: LOCATION SIMPLE: RIGHT FOREHEAD

## 2021-08-20 ASSESSMENT — LOCATION DETAILED DESCRIPTION DERM: LOCATION DETAILED: RIGHT MEDIAL FOREHEAD

## 2021-08-20 NOTE — HPI: PIMPLES (ACNE)
What Type Of Note Output Would You Prefer (Optional)?: Standard Output
Is This A New Presentation, Or A Follow-Up?: Follow Up Acne
Additional Comments (Use Complete Sentences): Just finished her second month, dosage of 30 BID. Doing well, had a good month. Dry skin and lips, tolerable. Only about 5 breakouts this past month. No other side effects.

## 2021-08-20 NOTE — PROCEDURE: COUNSELING
Tazorac Counseling:  Patient advised that medication is irritating and drying.  Patient may need to apply sparingly and wash off after an hour before eventually leaving it on overnight.  The patient verbalized understanding of the proper use and possible adverse effects of tazorac.  All of the patient's questions and concerns were addressed.
Doxycycline Counseling:  Patient counseled regarding possible photosensitivity and increased risk for sunburn.  Patient instructed to avoid sunlight, if possible.  When exposed to sunlight, patients should wear protective clothing, sunglasses, and sunscreen.  The patient was instructed to call the office immediately if the following severe adverse effects occur:  hearing changes, easy bruising/bleeding, severe headache, or vision changes.  The patient verbalized understanding of the proper use and possible adverse effects of doxycycline.  All of the patient's questions and concerns were addressed.
Sarecycline Counseling: Patient advised regarding possible photosensitivity and discoloration of the teeth, skin, lips, tongue and gums.  Patient instructed to avoid sunlight, if possible.  When exposed to sunlight, patients should wear protective clothing, sunglasses, and sunscreen.  The patient was instructed to call the office immediately if the following severe adverse effects occur:  hearing changes, easy bruising/bleeding, severe headache, or vision changes.  The patient verbalized understanding of the proper use and possible adverse effects of sarecycline.  All of the patient's questions and concerns were addressed.
Spironolactone Counseling: Patient advised regarding risks of diarrhea, abdominal pain, hyperkalemia, birth defects (for female patients), liver toxicity and renal toxicity. The patient may need blood work to monitor liver and kidney function and potassium levels while on therapy. The patient verbalized understanding of the proper use and possible adverse effects of spironolactone.  All of the patient's questions and concerns were addressed.
Benzoyl Peroxide Counseling: Patient counseled that medicine may cause skin irritation and bleach clothing.  In the event of skin irritation, the patient was advised to reduce the amount of the drug applied or use it less frequently.   The patient verbalized understanding of the proper use and possible adverse effects of benzoyl peroxide.  All of the patient's questions and concerns were addressed.
Birth Control Pills Counseling: Birth Control Pill Counseling: I discussed with the patient the potential side effects of OCPs including but not limited to increased risk of stroke, heart attack, thrombophlebitis, deep venous thrombosis, hepatic adenomas, breast changes, GI upset, headaches, and depression.  The patient verbalized understanding of the proper use and possible adverse effects of OCPs. All of the patient's questions and concerns were addressed.
Azithromycin Pregnancy And Lactation Text: This medication is considered safe during pregnancy and is also secreted in breast milk.
Include Pregnancy/Lactation Warning?: No
Topical Clindamycin Pregnancy And Lactation Text: This medication is Pregnancy Category B and is considered safe during pregnancy. It is unknown if it is excreted in breast milk.
Tetracycline Pregnancy And Lactation Text: This medication is Pregnancy Category D and not consider safe during pregnancy. It is also excreted in breast milk.
Doxycycline Pregnancy And Lactation Text: This medication is Pregnancy Category D and not consider safe during pregnancy. It is also excreted in breast milk but is considered safe for shorter treatment courses.
Detail Level: Zone
High Dose Vitamin A Pregnancy And Lactation Text: High dose vitamin A therapy is contraindicated during pregnancy and breast feeding.
High Dose Vitamin A Counseling: Side effects reviewed, pt to contact office should one occur.
Topical Sulfur Applications Pregnancy And Lactation Text: This medication is Pregnancy Category C and has an unknown safety profile during pregnancy. It is unknown if this topical medication is excreted in breast milk.
Dapsone Counseling: I discussed with the patient the risks of dapsone including but not limited to hemolytic anemia, agranulocytosis, rashes, methemoglobinemia, kidney failure, peripheral neuropathy, headaches, GI upset, and liver toxicity.  Patients who start dapsone require monitoring including baseline LFTs and weekly CBCs for the first month, then every month thereafter.  The patient verbalized understanding of the proper use and possible adverse effects of dapsone.  All of the patient's questions and concerns were addressed.
Bactrim Pregnancy And Lactation Text: This medication is Pregnancy Category D and is known to cause fetal risk.  It is also excreted in breast milk.
Tetracycline Counseling: Patient counseled regarding possible photosensitivity and increased risk for sunburn.  Patient instructed to avoid sunlight, if possible.  When exposed to sunlight, patients should wear protective clothing, sunglasses, and sunscreen.  The patient was instructed to call the office immediately if the following severe adverse effects occur:  hearing changes, easy bruising/bleeding, severe headache, or vision changes.  The patient verbalized understanding of the proper use and possible adverse effects of tetracycline.  All of the patient's questions and concerns were addressed. Patient understands to avoid pregnancy while on therapy due to potential birth defects.
Benzoyl Peroxide Pregnancy And Lactation Text: This medication is Pregnancy Category C. It is unknown if benzoyl peroxide is excreted in breast milk.
Spironolactone Pregnancy And Lactation Text: This medication can cause feminization of the male fetus and should be avoided during pregnancy. The active metabolite is also found in breast milk.
Erythromycin Pregnancy And Lactation Text: This medication is Pregnancy Category B and is considered safe during pregnancy. It is also excreted in breast milk.
Isotretinoin Pregnancy And Lactation Text: This medication is Pregnancy Category X and is considered extremely dangerous during pregnancy. It is unknown if it is excreted in breast milk.
Birth Control Pills Pregnancy And Lactation Text: This medication should be avoided if pregnant and for the first 30 days post-partum.
Topical Retinoid counseling:  Patient advised to apply a pea-sized amount only at bedtime and wait 30 minutes after washing their face before applying.  If too drying, patient may add a non-comedogenic moisturizer. The patient verbalized understanding of the proper use and possible adverse effects of retinoids.  All of the patient's questions and concerns were addressed.
Azithromycin Counseling:  I discussed with the patient the risks of azithromycin including but not limited to GI upset, allergic reaction, drug rash, diarrhea, and yeast infections.
Minocycline Counseling: Patient advised regarding possible photosensitivity and discoloration of the teeth, skin, lips, tongue and gums.  Patient instructed to avoid sunlight, if possible.  When exposed to sunlight, patients should wear protective clothing, sunglasses, and sunscreen.  The patient was instructed to call the office immediately if the following severe adverse effects occur:  hearing changes, easy bruising/bleeding, severe headache, or vision changes.  The patient verbalized understanding of the proper use and possible adverse effects of minocycline.  All of the patient's questions and concerns were addressed.
Dapsone Pregnancy And Lactation Text: This medication is Pregnancy Category C and is not considered safe during pregnancy or breast feeding.
Topical Retinoid Pregnancy And Lactation Text: This medication is Pregnancy Category C. It is unknown if this medication is excreted in breast milk.
Topical Clindamycin Counseling: Patient counseled that this medication may cause skin irritation or allergic reactions.  In the event of skin irritation, the patient was advised to reduce the amount of the drug applied or use it less frequently.   The patient verbalized understanding of the proper use and possible adverse effects of clindamycin.  All of the patient's questions and concerns were addressed.
Erythromycin Counseling:  I discussed with the patient the risks of erythromycin including but not limited to GI upset, allergic reaction, drug rash, diarrhea, increase in liver enzymes, and yeast infections.
Topical Sulfur Applications Counseling: Topical Sulfur Counseling: Patient counseled that this medication may cause skin irritation or allergic reactions.  In the event of skin irritation, the patient was advised to reduce the amount of the drug applied or use it less frequently.   The patient verbalized understanding of the proper use and possible adverse effects of topical sulfur application.  All of the patient's questions and concerns were addressed.
Isotretinoin Counseling: Patient should get monthly blood tests, not donate blood, not drive at night if vision affected, not share medication, and not undergo elective surgery for 6 months after tx completed. Side effects reviewed, pt to contact office should one occur.
Bactrim Counseling:  I discussed with the patient the risks of sulfa antibiotics including but not limited to GI upset, allergic reaction, drug rash, diarrhea, dizziness, photosensitivity, and yeast infections.  Rarely, more serious reactions can occur including but not limited to aplastic anemia, agranulocytosis, methemoglobinemia, blood dyscrasias, liver or kidney failure, lung infiltrates or desquamative/blistering drug rashes.
Tazorac Pregnancy And Lactation Text: This medication is not safe during pregnancy. It is unknown if this medication is excreted in breast milk.

## 2021-08-20 NOTE — PROCEDURE: ADDITIONAL NOTES
Render Risk Assessment In Note?: no
Detail Level: Simple
Additional Notes: Isotretinoin Counseling: Reviewed with patient that treatment includes monthly visits with pregnancy tests and frequent blood draws. Explained that the medication is metabolized through the liver so we monitor liver function and cholesterol levels. Stated that typical treatment time is 5-9 months. Patient should be on two forms of birth control or pledge abstinence, not donate blood or plasma, not share medication, and not undergo elective surgery or procedures including waxing, tattoos, or piercings while on treatment or for 6 months after treatment is completed. Side effects were reviewed with the patient in detail including but not limited to dry skin, lips, eyes and nose, nosebleeds, joint or muscle aches, flushing, and headaches. Rare but important side effects to notify the clinic about include mood changes, depression, or bowel changes. Recommended that the patient review the iPledge booklet in detail. Counseled that in many patients, acne may return at an unpredictable time for unknown reasons. All questions were answered regarding this medication. \\n\\nA clinical assistant was present for the exam. Care instructions were explained to the patient in detail. Told patient to call with any concerns or questions. The patient verbalized understanding and agreement of the education provided and the treatment plan. Encouraged patient to schedule a follow up appointment right after visit. At the end of the visit, all questions had been answered and the patient was satisfied with the visit.

## 2021-09-21 ENCOUNTER — APPOINTMENT (OUTPATIENT)
Dept: URBAN - METROPOLITAN AREA CLINIC 253 | Age: 26
Setting detail: DERMATOLOGY
End: 2021-09-21

## 2021-09-21 VITALS — RESPIRATION RATE: 15 BRPM | HEIGHT: 63 IN | WEIGHT: 125 LBS

## 2021-09-21 DIAGNOSIS — L70.0 ACNE VULGARIS: ICD-10-CM

## 2021-09-21 PROCEDURE — OTHER ADDITIONAL NOTES: OTHER

## 2021-09-21 PROCEDURE — 81025 URINE PREGNANCY TEST: CPT

## 2021-09-21 PROCEDURE — OTHER URINE PREGNANCY TEST: OTHER

## 2021-09-21 PROCEDURE — OTHER PRESCRIPTION: OTHER

## 2021-09-21 PROCEDURE — OTHER COUNSELING: OTHER

## 2021-09-21 PROCEDURE — 99214 OFFICE O/P EST MOD 30 MIN: CPT

## 2021-09-21 PROCEDURE — OTHER ISOTRETINOIN MONITORING: OTHER

## 2021-09-21 RX ORDER — ISOTRETINOIN 40 MG/1
40 MG CAPSULE, LIQUID FILLED ORAL BID
Qty: 60 | Refills: 0 | Status: ERX | COMMUNITY
Start: 2021-09-21

## 2021-09-21 ASSESSMENT — LOCATION ZONE DERM: LOCATION ZONE: FACE

## 2021-09-21 ASSESSMENT — LOCATION SIMPLE DESCRIPTION DERM: LOCATION SIMPLE: RIGHT FOREHEAD

## 2021-09-21 ASSESSMENT — LOCATION DETAILED DESCRIPTION DERM: LOCATION DETAILED: RIGHT MEDIAL FOREHEAD

## 2021-09-21 NOTE — PROCEDURE: COUNSELING
Bactrim Pregnancy And Lactation Text: This medication is Pregnancy Category D and is known to cause fetal risk.  It is also excreted in breast milk.
Birth Control Pills Counseling: Birth Control Pill Counseling: I discussed with the patient the potential side effects of OCPs including but not limited to increased risk of stroke, heart attack, thrombophlebitis, deep venous thrombosis, hepatic adenomas, breast changes, GI upset, headaches, and depression.  The patient verbalized understanding of the proper use and possible adverse effects of OCPs. All of the patient's questions and concerns were addressed.
Isotretinoin Counseling: Patient should get monthly blood tests, not donate blood, not drive at night if vision affected, not share medication, and not undergo elective surgery for 6 months after tx completed. Side effects reviewed, pt to contact office should one occur.
Spironolactone Pregnancy And Lactation Text: This medication can cause feminization of the male fetus and should be avoided during pregnancy. The active metabolite is also found in breast milk.
Tazorac Counseling:  Patient advised that medication is irritating and drying.  Patient may need to apply sparingly and wash off after an hour before eventually leaving it on overnight.  The patient verbalized understanding of the proper use and possible adverse effects of tazorac.  All of the patient's questions and concerns were addressed.
Sarecycline Pregnancy And Lactation Text: This medication is Pregnancy Category D and not consider safe during pregnancy. It is also excreted in breast milk.
Doxycycline Counseling:  Patient counseled regarding possible photosensitivity and increased risk for sunburn.  Patient instructed to avoid sunlight, if possible.  When exposed to sunlight, patients should wear protective clothing, sunglasses, and sunscreen.  The patient was instructed to call the office immediately if the following severe adverse effects occur:  hearing changes, easy bruising/bleeding, severe headache, or vision changes.  The patient verbalized understanding of the proper use and possible adverse effects of doxycycline.  All of the patient's questions and concerns were addressed.
Bactrim Counseling:  I discussed with the patient the risks of sulfa antibiotics including but not limited to GI upset, allergic reaction, drug rash, diarrhea, dizziness, photosensitivity, and yeast infections.  Rarely, more serious reactions can occur including but not limited to aplastic anemia, agranulocytosis, methemoglobinemia, blood dyscrasias, liver or kidney failure, lung infiltrates or desquamative/blistering drug rashes.
Minocycline Counseling: Patient advised regarding possible photosensitivity and discoloration of the teeth, skin, lips, tongue and gums.  Patient instructed to avoid sunlight, if possible.  When exposed to sunlight, patients should wear protective clothing, sunglasses, and sunscreen.  The patient was instructed to call the office immediately if the following severe adverse effects occur:  hearing changes, easy bruising/bleeding, severe headache, or vision changes.  The patient verbalized understanding of the proper use and possible adverse effects of minocycline.  All of the patient's questions and concerns were addressed.
Doxycycline Pregnancy And Lactation Text: This medication is Pregnancy Category D and not consider safe during pregnancy. It is also excreted in breast milk but is considered safe for shorter treatment courses.
Topical Clindamycin Pregnancy And Lactation Text: This medication is Pregnancy Category B and is considered safe during pregnancy. It is unknown if it is excreted in breast milk.
Spironolactone Counseling: Patient advised regarding risks of diarrhea, abdominal pain, hyperkalemia, birth defects (for female patients), liver toxicity and renal toxicity. The patient may need blood work to monitor liver and kidney function and potassium levels while on therapy. The patient verbalized understanding of the proper use and possible adverse effects of spironolactone.  All of the patient's questions and concerns were addressed.
Birth Control Pills Pregnancy And Lactation Text: This medication should be avoided if pregnant and for the first 30 days post-partum.
Topical Clindamycin Counseling: Patient counseled that this medication may cause skin irritation or allergic reactions.  In the event of skin irritation, the patient was advised to reduce the amount of the drug applied or use it less frequently.   The patient verbalized understanding of the proper use and possible adverse effects of clindamycin.  All of the patient's questions and concerns were addressed.
Benzoyl Peroxide Pregnancy And Lactation Text: This medication is Pregnancy Category C. It is unknown if benzoyl peroxide is excreted in breast milk.
Dapsone Pregnancy And Lactation Text: This medication is Pregnancy Category C and is not considered safe during pregnancy or breast feeding.
Azithromycin Pregnancy And Lactation Text: This medication is considered safe during pregnancy and is also secreted in breast milk.
Tazorac Pregnancy And Lactation Text: This medication is not safe during pregnancy. It is unknown if this medication is excreted in breast milk.
Erythromycin Pregnancy And Lactation Text: This medication is Pregnancy Category B and is considered safe during pregnancy. It is also excreted in breast milk.
High Dose Vitamin A Pregnancy And Lactation Text: High dose vitamin A therapy is contraindicated during pregnancy and breast feeding.
Topical Sulfur Applications Pregnancy And Lactation Text: This medication is Pregnancy Category C and has an unknown safety profile during pregnancy. It is unknown if this topical medication is excreted in breast milk.
Azithromycin Counseling:  I discussed with the patient the risks of azithromycin including but not limited to GI upset, allergic reaction, drug rash, diarrhea, and yeast infections.
Benzoyl Peroxide Counseling: Patient counseled that medicine may cause skin irritation and bleach clothing.  In the event of skin irritation, the patient was advised to reduce the amount of the drug applied or use it less frequently.   The patient verbalized understanding of the proper use and possible adverse effects of benzoyl peroxide.  All of the patient's questions and concerns were addressed.
High Dose Vitamin A Counseling: Side effects reviewed, pt to contact office should one occur.
Dapsone Counseling: I discussed with the patient the risks of dapsone including but not limited to hemolytic anemia, agranulocytosis, rashes, methemoglobinemia, kidney failure, peripheral neuropathy, headaches, GI upset, and liver toxicity.  Patients who start dapsone require monitoring including baseline LFTs and weekly CBCs for the first month, then every month thereafter.  The patient verbalized understanding of the proper use and possible adverse effects of dapsone.  All of the patient's questions and concerns were addressed.
Erythromycin Counseling:  I discussed with the patient the risks of erythromycin including but not limited to GI upset, allergic reaction, drug rash, diarrhea, increase in liver enzymes, and yeast infections.
Topical Sulfur Applications Counseling: Topical Sulfur Counseling: Patient counseled that this medication may cause skin irritation or allergic reactions.  In the event of skin irritation, the patient was advised to reduce the amount of the drug applied or use it less frequently.   The patient verbalized understanding of the proper use and possible adverse effects of topical sulfur application.  All of the patient's questions and concerns were addressed.
Isotretinoin Pregnancy And Lactation Text: This medication is Pregnancy Category X and is considered extremely dangerous during pregnancy. It is unknown if it is excreted in breast milk.
Include Pregnancy/Lactation Warning?: No
Tetracycline Counseling: Patient counseled regarding possible photosensitivity and increased risk for sunburn.  Patient instructed to avoid sunlight, if possible.  When exposed to sunlight, patients should wear protective clothing, sunglasses, and sunscreen.  The patient was instructed to call the office immediately if the following severe adverse effects occur:  hearing changes, easy bruising/bleeding, severe headache, or vision changes.  The patient verbalized understanding of the proper use and possible adverse effects of tetracycline.  All of the patient's questions and concerns were addressed. Patient understands to avoid pregnancy while on therapy due to potential birth defects.
Detail Level: Zone
Topical Retinoid Pregnancy And Lactation Text: This medication is Pregnancy Category C. It is unknown if this medication is excreted in breast milk.
Topical Retinoid counseling:  Patient advised to apply a pea-sized amount only at bedtime and wait 30 minutes after washing their face before applying.  If too drying, patient may add a non-comedogenic moisturizer. The patient verbalized understanding of the proper use and possible adverse effects of retinoids.  All of the patient's questions and concerns were addressed.
Sarecycline Counseling: Patient advised regarding possible photosensitivity and discoloration of the teeth, skin, lips, tongue and gums.  Patient instructed to avoid sunlight, if possible.  When exposed to sunlight, patients should wear protective clothing, sunglasses, and sunscreen.  The patient was instructed to call the office immediately if the following severe adverse effects occur:  hearing changes, easy bruising/bleeding, severe headache, or vision changes.  The patient verbalized understanding of the proper use and possible adverse effects of sarecycline.  All of the patient's questions and concerns were addressed.

## 2021-09-21 NOTE — HPI: PIMPLES (ACNE)
What Type Of Note Output Would You Prefer (Optional)?: Standard Output
Is This A New Presentation, Or A Follow-Up?: Follow Up Acne
Additional Comments (Use Complete Sentences): Following up after month three of accutane. She is on 30mg BID. She had dry skin and lips, no new side effects. No other side effects. Her acne seems good, she maybe just had a couple small breakouts this month, one just seems like a blackhead that’s surfacing. She goes to [employer] next month.

## 2021-09-21 NOTE — PROCEDURE: ADDITIONAL NOTES
Additional Notes: Patient is leaving for [place] before her window. Instructed to come in the last day before she leaves and see if we can confirm her. If not will cancel at that time and attempt to facilitate a virtual visit while she is away. Sent in 40 BID, but advised to alternate QD and BID so she doesn’t run out.\\n\\nIsotretinoin Counseling: Reviewed with patient that treatment includes monthly visits with pregnancy tests and frequent blood draws. Explained that the medication is metabolized through the liver so we monitor liver function and cholesterol levels. Stated that typical treatment time is 5-9 months. Patient should be on two forms of birth control or pledge abstinence, not donate blood or plasma, not share medication, and not undergo elective surgery or procedures including waxing, tattoos, or piercings while on treatment or for 6 months after treatment is completed. Side effects were reviewed with the patient in detail including but not limited to dry skin, lips, eyes and nose, nosebleeds, joint or muscle aches, flushing, and headaches. Rare but important side effects to notify the clinic about include mood changes, depression, or bowel changes. Recommended that the patient review the iPledge booklet in detail. Counseled that in many patients, acne may return at an unpredictable time for unknown reasons. All questions were answered regarding this medication. \\n\\nA clinical assistant was present for the exam. Care instructions were explained to the patient in detail. Told patient to call with any concerns or questions. The patient verbalized understanding and agreement of the education provided and the treatment plan. Encouraged patient to schedule a follow up appointment right after visit. At the end of the visit, all questions had been answered and the patient was satisfied with the visit. Additional Notes: Patient is leaving for "[employer] before her window. Instructed to come in the last day before she leaves and see if we can confirm her. If not will cancel at that time and attempt to facilitate a virtual visit while she is away. Sent in 40 BID, but advised to alternate QD and BID so she doesn’t run out.\\n\\nIsotretinoin Counseling: Reviewed with patient that treatment includes monthly visits with pregnancy tests and frequent blood draws. Explained that the medication is metabolized through the liver so we monitor liver function and cholesterol levels. Stated that typical treatment time is 5-9 months. Patient should be on two forms of birth control or pledge abstinence, not donate blood or plasma, not share medication, and not undergo elective surgery or procedures including waxing, tattoos, or piercings while on treatment or for 6 months after treatment is completed. Side effects were reviewed with the patient in detail including but not limited to dry skin, lips, eyes and nose, nosebleeds, joint or muscle aches, flushing, and headaches. Rare but important side effects to notify the clinic about include mood changes, depression, or bowel changes. Recommended that the patient review the iPledge booklet in detail. Counseled that in many patients, acne may return at an unpredictable time for unknown reasons. All questions were answered regarding this medication. \\n\\nA clinical assistant was present for the exam. Care instructions were explained to the patient in detail. Told patient to call with any concerns or questions. The patient verbalized understanding and agreement of the education provided and the treatment plan. Encouraged patient to schedule a follow up appointment right after visit. At the end of the visit, all questions had been answered and the patient was satisfied with the visit.

## 2021-10-11 ENCOUNTER — HEALTH MAINTENANCE LETTER (OUTPATIENT)
Age: 26
End: 2021-10-11

## 2021-10-15 ENCOUNTER — APPOINTMENT (OUTPATIENT)
Dept: URBAN - METROPOLITAN AREA CLINIC 253 | Age: 26
Setting detail: DERMATOLOGY
End: 2021-10-15

## 2021-10-15 VITALS — HEIGHT: 63 IN | WEIGHT: 125 LBS

## 2021-10-15 DIAGNOSIS — L70.0 ACNE VULGARIS: ICD-10-CM

## 2021-10-15 PROCEDURE — 99214 OFFICE O/P EST MOD 30 MIN: CPT

## 2021-10-15 PROCEDURE — 81025 URINE PREGNANCY TEST: CPT

## 2021-10-15 PROCEDURE — OTHER URINE PREGNANCY TEST: OTHER

## 2021-10-15 PROCEDURE — OTHER ISOTRETINOIN MONITORING: OTHER

## 2021-10-15 PROCEDURE — OTHER PRESCRIPTION: OTHER

## 2021-10-15 PROCEDURE — OTHER ADDITIONAL NOTES: OTHER

## 2021-10-15 RX ORDER — ISOTRETINOIN 30 MG/1
30 CAPSULE, LIQUID FILLED ORAL TWICE PER DAY
Qty: 60 | Refills: 0 | Status: ERX | COMMUNITY
Start: 2021-10-15

## 2021-10-15 ASSESSMENT — LOCATION ZONE DERM: LOCATION ZONE: FACE

## 2021-10-15 ASSESSMENT — LOCATION SIMPLE DESCRIPTION DERM: LOCATION SIMPLE: LEFT CHEEK

## 2021-10-15 ASSESSMENT — LOCATION DETAILED DESCRIPTION DERM: LOCATION DETAILED: LEFT CENTRAL MALAR CHEEK

## 2021-10-15 NOTE — HPI: PIMPLES (ACNE)
What Type Of Note Output Would You Prefer (Optional)?: Standard Output
Is This A New Presentation, Or A Follow-Up?: Follow Up Acne
Additional Comments (Use Complete Sentences): Doing well. Dry skin and lips, manageable. No other side effects. Felt very clear, maybe just one blackhead that surfaced.

## 2021-10-15 NOTE — PROCEDURE: ADDITIONAL NOTES
Detail Level: Simple
Render Risk Assessment In Note?: no
Additional Notes: Isotretinoin Counseling: Reviewed with patient that treatment includes monthly visits with pregnancy tests and frequent blood draws. Explained that the medication is metabolized through the liver so we monitor liver function and cholesterol levels. Stated that typical treatment time is 5-9 months. Patient should be on two forms of birth control or pledge abstinence, not donate blood or plasma, not share medication, and not undergo elective surgery or procedures including waxing, tattoos, or piercings while on treatment or for 6 months after treatment is completed. Side effects were reviewed with the patient in detail including but not limited to dry skin, lips, eyes and nose, nosebleeds, joint or muscle aches, flushing, and headaches. Rare but important side effects to notify the clinic about include mood changes, depression, or bowel changes. Recommended that the patient review the iPledge booklet in detail. Counseled that in many patients, acne may return at an unpredictable time for unknown reasons. All questions were answered regarding this medication. \\n\\nA clinical assistant was present for the exam. Care instructions were explained to the patient in detail. Told patient to call with any concerns or questions. The patient verbalized understanding and agreement of the education provided and the treatment plan. Encouraged patient to schedule a follow up appointment right after visit. At the end of the visit, all questions had been answered and the patient was satisfied with the visit.

## 2021-10-15 NOTE — PROCEDURE: ISOTRETINOIN MONITORING
Comments: Patient was 6 days early due to being in [place] during normal window. Patient was still able to be confirmed in [employer]ledge. Comments: Patient was 6 days early due to being in [place] during normal window. Patient was still able to be confirmed in [employer].

## 2021-11-16 ENCOUNTER — APPOINTMENT (OUTPATIENT)
Dept: URBAN - METROPOLITAN AREA CLINIC 253 | Age: 26
Setting detail: DERMATOLOGY
End: 2021-11-16

## 2021-11-16 VITALS — WEIGHT: 125 LBS | HEIGHT: 63 IN | RESPIRATION RATE: 14 BRPM

## 2021-11-16 DIAGNOSIS — L70.0 ACNE VULGARIS: ICD-10-CM

## 2021-11-16 PROCEDURE — 81025 URINE PREGNANCY TEST: CPT

## 2021-11-16 PROCEDURE — OTHER ISOTRETINOIN MONITORING: OTHER

## 2021-11-16 PROCEDURE — OTHER URINE PREGNANCY TEST: OTHER

## 2021-11-16 PROCEDURE — OTHER ADDITIONAL NOTES: OTHER

## 2021-11-16 PROCEDURE — 99214 OFFICE O/P EST MOD 30 MIN: CPT

## 2021-11-16 PROCEDURE — OTHER MIPS QUALITY: OTHER

## 2021-11-16 PROCEDURE — OTHER PRESCRIPTION: OTHER

## 2021-11-16 RX ORDER — ISOTRETINOIN 30 MG/1
30 MG CAPSULE, LIQUID FILLED ORAL BID
Qty: 60 | Refills: 0 | Status: ERX

## 2021-11-16 ASSESSMENT — LOCATION DETAILED DESCRIPTION DERM: LOCATION DETAILED: LEFT CENTRAL MALAR CHEEK

## 2021-11-16 ASSESSMENT — LOCATION SIMPLE DESCRIPTION DERM: LOCATION SIMPLE: LEFT CHEEK

## 2021-11-16 ASSESSMENT — LOCATION ZONE DERM: LOCATION ZONE: FACE

## 2021-11-16 NOTE — HPI: PIMPLES (ACNE)
What Type Of Note Output Would You Prefer (Optional)?: Standard Output
Is This A New Presentation, Or A Follow-Up?: Follow Up Acne
Additional Comments (Use Complete Sentences): Finished fifth month, 30 BID. Things are going well. Better this month. No new side effects. Dry skin and lips are manageable. She had some weird pain in her clavicle but doesn’t think it’s related. She plans to go to her PCP. Just started about 5-6 days ago. She is a side sleeper and does work out so thinks it may be from that.

## 2021-11-16 NOTE — PROCEDURE: ADDITIONAL NOTES
Render Risk Assessment In Note?: no
Additional Notes: Isotretinoin Counseling: Reviewed with patient that treatment includes monthly visits with pregnancy tests and frequent blood draws. Explained that the medication is metabolized through the liver so we monitor liver function and cholesterol levels. Stated that typical treatment time is 5-9 months. Patient should be on two forms of birth control or pledge abstinence, not donate blood or plasma, not share medication, and not undergo elective surgery or procedures including waxing, tattoos, or piercings while on treatment or for 6 months after treatment is completed. Side effects were reviewed with the patient in detail including but not limited to dry skin, lips, eyes and nose, nosebleeds, joint or muscle aches, flushing, and headaches. Rare but important side effects to notify the clinic about include mood changes, depression, or bowel changes. Recommended that the patient review the iPledge booklet in detail. Counseled that in many patients, acne may return at an unpredictable time for unknown reasons. All questions were answered regarding this medication.\\n\\nA clinical assistant was present for the exam. Care instructions were explained to the patient in detail. Told patient to call with any concerns or questions. The patient verbalized understanding and agreement of the education provided and the treatment plan. Encouraged patient to schedule a follow up appointment right after visit. At the end of the visit, all questions had been answered and the patient was satisfied with the visit.
Detail Level: Simple

## 2021-12-21 ENCOUNTER — APPOINTMENT (OUTPATIENT)
Dept: URBAN - METROPOLITAN AREA CLINIC 253 | Age: 26
Setting detail: DERMATOLOGY
End: 2021-12-21

## 2021-12-21 VITALS — WEIGHT: 125 LBS | HEIGHT: 63 IN | RESPIRATION RATE: 14 BRPM

## 2021-12-21 DIAGNOSIS — L70.0 ACNE VULGARIS: ICD-10-CM

## 2021-12-21 PROCEDURE — 81025 URINE PREGNANCY TEST: CPT

## 2021-12-21 PROCEDURE — OTHER URINE PREGNANCY TEST: OTHER

## 2021-12-21 PROCEDURE — OTHER PRESCRIPTION: OTHER

## 2021-12-21 PROCEDURE — OTHER ISOTRETINOIN MONITORING: OTHER

## 2021-12-21 PROCEDURE — OTHER MIPS QUALITY: OTHER

## 2021-12-21 PROCEDURE — OTHER ADDITIONAL NOTES: OTHER

## 2021-12-21 PROCEDURE — 99214 OFFICE O/P EST MOD 30 MIN: CPT

## 2021-12-21 RX ORDER — ISOTRETINOIN 30 MG/1
30 MG CAPSULE, LIQUID FILLED ORAL BID
Qty: 60 | Refills: 0 | Status: ERX

## 2021-12-21 ASSESSMENT — LOCATION SIMPLE DESCRIPTION DERM: LOCATION SIMPLE: LEFT CHEEK

## 2021-12-21 ASSESSMENT — LOCATION DETAILED DESCRIPTION DERM: LOCATION DETAILED: LEFT CENTRAL MALAR CHEEK

## 2021-12-21 ASSESSMENT — LOCATION ZONE DERM: LOCATION ZONE: FACE

## 2021-12-21 NOTE — PROCEDURE: ADDITIONAL NOTES
Detail Level: Simple
Render Risk Assessment In Note?: no
Additional Notes: Isotretinoin Counseling: Reviewed with patient that treatment includes monthly visits with pregnancy tests and frequent blood draws. Explained that the medication is metabolized through the liver so we monitor liver function and cholesterol levels. Stated that typical treatment time is 5-9 months. Patient should be on two forms of birth control or pledge abstinence, not donate blood or plasma, not share medication, and not undergo elective surgery or procedures including waxing, tattoos, or piercings while on treatment or for 6 months after treatment is completed. Side effects were reviewed with the patient in detail including but not limited to dry skin, lips, eyes and nose, nosebleeds, joint or muscle aches, flushing, and headaches. Rare but important side effects to notify the clinic about include mood changes, depression, or bowel changes. Recommended that the patient review the iPledge booklet in detail. Counseled that in many patients, acne may return at an unpredictable time for unknown reasons. All questions were answered regarding this medication. \\n\\nDiscussed iPledge transition and issues. Check email often for login info. Patient was unable to login today. Gave her pregnancy test to take at home and show virtually if needed \\n\\nA clinical assistant was present for the exam. Care instructions were explained to the patient in detail. Told patient to call with any concerns or questions. The patient verbalized understanding and agreement of the education provided and the treatment plan. Encouraged patient to schedule a follow up appointment right after visit. At the end of the visit, all questions had been answered and the patient was satisfied with the visit.

## 2021-12-21 NOTE — HPI: PIMPLES (ACNE)
What Type Of Note Output Would You Prefer (Optional)?: Standard Output
Is This A New Presentation, Or A Follow-Up?: Follow Up Acne
Additional Comments (Use Complete Sentences): No new side effects. She is doing well. She had one small pimple this month. It was very minor and went away in about two days.

## 2022-01-28 ENCOUNTER — APPOINTMENT (OUTPATIENT)
Age: 27
Setting detail: DERMATOLOGY
End: 2022-01-28

## 2022-01-28 VITALS — WEIGHT: 125 LBS | RESPIRATION RATE: 14 BRPM | HEIGHT: 62 IN

## 2022-01-28 DIAGNOSIS — L70.0 ACNE VULGARIS: ICD-10-CM

## 2022-01-28 PROCEDURE — OTHER ADDITIONAL NOTES: OTHER

## 2022-01-28 PROCEDURE — OTHER PRESCRIPTION: OTHER

## 2022-01-28 RX ORDER — ISOTRETINOIN 30 MG/1
30 MG CAPSULE, LIQUID FILLED ORAL BID
Qty: 60 | Refills: 0 | Status: ERX

## 2022-01-30 ENCOUNTER — HEALTH MAINTENANCE LETTER (OUTPATIENT)
Age: 27
End: 2022-01-30

## 2022-03-04 ENCOUNTER — APPOINTMENT (OUTPATIENT)
Age: 27
Setting detail: DERMATOLOGY
End: 2022-03-04

## 2022-03-04 VITALS — WEIGHT: 125 LBS | HEIGHT: 62.5 IN | RESPIRATION RATE: 14 BRPM

## 2022-03-04 DIAGNOSIS — L70.0 ACNE VULGARIS: ICD-10-CM

## 2022-03-04 PROCEDURE — 99214 OFFICE O/P EST MOD 30 MIN: CPT | Mod: 95

## 2022-03-04 PROCEDURE — OTHER ADDITIONAL NOTES: OTHER

## 2022-03-04 PROCEDURE — OTHER ORDER TESTS: OTHER

## 2022-03-04 PROCEDURE — OTHER PRESCRIPTION: OTHER

## 2022-03-04 PROCEDURE — OTHER MIPS QUALITY: OTHER

## 2022-03-04 PROCEDURE — OTHER ISOTRETINOIN MONITORING: OTHER

## 2022-03-04 RX ORDER — ISOTRETINOIN 30 MG/1
30 MG CAPSULE, LIQUID FILLED ORAL BID
Qty: 60 | Refills: 0 | Status: ERX

## 2022-03-04 ASSESSMENT — LOCATION SIMPLE DESCRIPTION DERM: LOCATION SIMPLE: LEFT CHEEK

## 2022-03-04 ASSESSMENT — LOCATION DETAILED DESCRIPTION DERM: LOCATION DETAILED: LEFT CENTRAL MALAR CHEEK

## 2022-03-04 ASSESSMENT — LOCATION ZONE DERM: LOCATION ZONE: FACE

## 2022-03-04 NOTE — PROCEDURE: ADDITIONAL NOTES
Detail Level: Simple
Render Risk Assessment In Note?: no
Additional Notes: Pt showed negative UPT virtually. \\nEmailed lab order to pt. She will have fasting labs drawn and fax results to us. \\n\\nDiscussed stopping due to hitting cumulative dose vs pushing it another month due to pt experiencing breakouts the last couple months. Pt would like to continue another month \\n\\nThe patient provided consent to utilize [email or web address] video chat for this telehealth visit via our electronic devices and is aware that this visit will not be recorded. Our  will serve as our witness. This is done due to COVID-19 pandemic. She was at home and I was at my office during this visit. I spent 10 minutes on this encounter.\\n\\nIsotretinoin Counseling: Reviewed with patient that treatment includes monthly visits with pregnancy tests and frequent blood draws. Explained that the medication is metabolized through the liver so we monitor liver function and cholesterol levels. Stated that typical treatment time is 5-9 months. Patient should be on two forms of birth control or pledge abstinence, not donate blood or plasma, not share medication, and not undergo elective surgery or procedures including waxing, tattoos, or piercings while on treatment or for 6 months after treatment is completed. Side effects were reviewed with the patient in detail including but not limited to dry skin, lips, eyes and nose, nosebleeds, joint or muscle aches, flushing, and headaches. Rare but important side effects to notify the clinic about include mood changes, depression, or bowel changes. Recommended that the patient review the iPledge booklet in detail. Counseled that in many patients, acne may return at an unpredictable time for unknown reasons. All questions were answered regarding this medication. \\n\\n\\nA clinical assistant was present for the exam. Care instructions were explained to the patient in detail. Told patient to call with any concerns or questions. The patient verbalized understanding and agreement of the education provided and the treatment plan. Encouraged patient to schedule a follow up appointment right after visit. At the end of the visit, all questions had been answered and the patient was satisfied with the visit.

## 2022-03-04 NOTE — HPI: PIMPLES (ACNE)
What Type Of Note Output Would You Prefer (Optional)?: Standard Output
Is This A New Presentation, Or A Follow-Up?: Follow Up Acne
Additional Comments (Use Complete Sentences): She finished month 7. Taking 30 BID. No new side effects, just dry skin and lips. Her month went well, she had no new bumps or acne. She wouldn’t mind doing one more month. She did go about two weeks without the medication in there due to iPledge issues and her traveling abroad.

## 2022-04-08 ENCOUNTER — APPOINTMENT (OUTPATIENT)
Age: 27
Setting detail: DERMATOLOGY
End: 2022-04-08

## 2022-04-08 VITALS — HEIGHT: 63 IN | WEIGHT: 125 LBS

## 2022-04-08 DIAGNOSIS — L70.0 ACNE VULGARIS: ICD-10-CM

## 2022-04-08 PROCEDURE — OTHER ADDITIONAL NOTES: OTHER

## 2022-04-08 PROCEDURE — OTHER ISOTRETINOIN MONITORING: OTHER

## 2022-04-08 PROCEDURE — 99214 OFFICE O/P EST MOD 30 MIN: CPT | Mod: 95

## 2022-04-08 PROCEDURE — OTHER PRESCRIPTION: OTHER

## 2022-04-08 PROCEDURE — OTHER MIPS QUALITY: OTHER

## 2022-04-08 RX ORDER — TRETIONIN 1 MG/G
CREAM TOPICAL
Qty: 45 | Refills: 3 | Status: ERX | COMMUNITY
Start: 2022-04-08

## 2022-04-08 ASSESSMENT — LOCATION DETAILED DESCRIPTION DERM: LOCATION DETAILED: LEFT CENTRAL MALAR CHEEK

## 2022-04-08 ASSESSMENT — LOCATION SIMPLE DESCRIPTION DERM: LOCATION SIMPLE: LEFT CHEEK

## 2022-04-08 ASSESSMENT — LOCATION ZONE DERM: LOCATION ZONE: FACE

## 2022-04-08 NOTE — HPI: PIMPLES (ACNE)
What Type Of Note Output Would You Prefer (Optional)?: Standard Output
Is This A New Presentation, Or A Follow-Up?: Follow Up Acne
Additional Comments (Use Complete Sentences): This last month went well, dry skin and lips. No new pimples. No other side effects.

## 2022-04-08 NOTE — PROCEDURE: ADDITIONAL NOTES
Detail Level: Simple
Render Risk Assessment In Note?: no
Additional Notes: Emailed lab order to pt. She will have fasting labs drawn and fax results to us. \\n\\nThe patient provided consent to utilize [email or web address] video chat for this telehealth visit via our electronic devices and is aware that this visit will not be recorded. Our  will serve as our witness. This is done due to COVID-19 pandemic. She was at home and I was at my office during this visit. I spent 10 minutes on this encounter.\\n\\nIsotretinoin Counseling: Reviewed with patient that treatment includes monthly visits with pregnancy tests and frequent blood draws. Explained that the medication is metabolized through the liver so we monitor liver function and cholesterol levels. Stated that typical treatment time is 5-9 months. Patient should be on two forms of birth control or pledge abstinence, not donate blood or plasma, not share medication, and not undergo elective surgery or procedures including waxing, tattoos, or piercings while on treatment or for 6 months after treatment is completed. Side effects were reviewed with the patient in detail including but not limited to dry skin, lips, eyes and nose, nosebleeds, joint or muscle aches, flushing, and headaches. Rare but important side effects to notify the clinic about include mood changes, depression, or bowel changes. Recommended that the patient review the iPledge booklet in detail. Counseled that in many patients, acne may return at an unpredictable time for unknown reasons. All questions were answered regarding this medication. \\n\\n\\nA clinical assistant was present for the exam. Care instructions were explained to the patient in detail. Told patient to call with any concerns or questions. The patient verbalized understanding and agreement of the education provided and the treatment plan. Encouraged patient to schedule a follow up appointment right after visit. At the end of the visit, all questions had been answered and the patient was satisfied with the visit.

## 2022-05-10 ENCOUNTER — APPOINTMENT (OUTPATIENT)
Dept: URBAN - METROPOLITAN AREA CLINIC 253 | Age: 27
Setting detail: DERMATOLOGY
End: 2022-05-10

## 2022-05-10 DIAGNOSIS — L70.0 ACNE VULGARIS: ICD-10-CM

## 2022-05-10 PROCEDURE — OTHER ADDITIONAL NOTES: OTHER

## 2022-05-10 NOTE — PROCEDURE: ADDITIONAL NOTES
Additional Notes: Patient provided a negative at home urine pregnancy test over the [email or web address] service. She showed the test before and after it was completed and confirmed as negative. The patient is not pregnant.
Render Risk Assessment In Note?: no
Detail Level: Simple

## 2022-09-24 ENCOUNTER — HEALTH MAINTENANCE LETTER (OUTPATIENT)
Age: 27
End: 2022-09-24

## 2023-05-08 ENCOUNTER — HEALTH MAINTENANCE LETTER (OUTPATIENT)
Age: 28
End: 2023-05-08